# Patient Record
Sex: FEMALE | URBAN - METROPOLITAN AREA
[De-identification: names, ages, dates, MRNs, and addresses within clinical notes are randomized per-mention and may not be internally consistent; named-entity substitution may affect disease eponyms.]

---

## 2022-11-10 ENCOUNTER — APPOINTMENT (RX ONLY)
Dept: URBAN - METROPOLITAN AREA CLINIC 156 | Facility: CLINIC | Age: 37
Setting detail: DERMATOLOGY
End: 2022-11-10

## 2022-11-10 DIAGNOSIS — L72.11 PILAR CYST: ICD-10-CM

## 2022-11-10 PROBLEM — H69.92: Status: ACTIVE | Noted: 2022-11-10

## 2022-11-10 PROCEDURE — 99202 OFFICE O/P NEW SF 15 MIN: CPT

## 2022-11-10 PROCEDURE — ? DEFER

## 2022-11-10 PROCEDURE — ? OBSERVATION AND MEASURE

## 2022-11-10 PROCEDURE — ? COUNSELING

## 2022-11-10 PROCEDURE — ? OTHER

## 2022-11-10 ASSESSMENT — LOCATION ZONE DERM: LOCATION ZONE: SCALP

## 2022-11-10 ASSESSMENT — LOCATION SIMPLE DESCRIPTION DERM: LOCATION SIMPLE: POSTERIOR SCALP

## 2022-11-10 ASSESSMENT — LOCATION DETAILED DESCRIPTION DERM: LOCATION DETAILED: POSTERIOR MID-PARIETAL SCALP

## 2022-11-10 NOTE — PROCEDURE: OTHER
Render Risk Assessment In Note?: no
Other (Free Text): Patient reports muffled hearing, congestion of the left ear that has not improved with Flonase, Sudafed, Zyrtec.  She has been followed by an ENT for a year and requests allergy testing today.  Explained we do contact dermatitis allergy patch testing at this office, but not skin prick or intradermal skin testing.  Explained unfortunately the type of patch allergy testing we do will not be helpful with her current symptoms and is to determine potential allergens for recurrent contact dermatitis of unknown etiology.  Recommended referral to allergist.  Patient voiced understanding.
Note Text (......Xxx Chief Complaint.): This diagnosis correlates with the
Detail Level: Zone

## 2022-11-10 NOTE — PROCEDURE: DEFER
Introduction Text (Please End With A Colon): The following procedure was deferred:
Instructions (Optional): Patient is happy with benign diagnosis.  This lesion is not bothersome to her.  She will call our office if she determines she would like this excised.
Size Of Lesion In Cm (Optional): 0
Detail Level: Detailed